# Patient Record
Sex: MALE | Race: WHITE | NOT HISPANIC OR LATINO | Employment: OTHER | URBAN - METROPOLITAN AREA
[De-identification: names, ages, dates, MRNs, and addresses within clinical notes are randomized per-mention and may not be internally consistent; named-entity substitution may affect disease eponyms.]

---

## 2017-01-10 ENCOUNTER — ALLSCRIPTS OFFICE VISIT (OUTPATIENT)
Dept: OTHER | Facility: OTHER | Age: 82
End: 2017-01-10

## 2017-04-11 ENCOUNTER — ALLSCRIPTS OFFICE VISIT (OUTPATIENT)
Dept: OTHER | Facility: OTHER | Age: 82
End: 2017-04-11

## 2018-01-13 VITALS — BODY MASS INDEX: 19.6 KG/M2 | WEIGHT: 140 LBS | HEIGHT: 71 IN | RESPIRATION RATE: 16 BRPM

## 2018-01-13 VITALS — WEIGHT: 140 LBS | HEIGHT: 71 IN | BODY MASS INDEX: 19.6 KG/M2

## 2018-01-18 NOTE — PROGRESS NOTES
Assessment    1  Foot pain (729 5) (M79 673)   2  Atherosclerotic peripheral vascular disease (440 20) (I70 209)   3  Onychomycosis (110 1) (B35 1)    Plan    · Follow-up visit in 3 months Evaluation and Treatment  Follow-up  Status: Hold For -  Scheduling  Requested for: [id number]   · Inspect your feet and legs daily if you have vascular disease ; Status:Complete;   Done:  [id number]    Discussion/Summary    Daily foot checks monitor for signs of infection discussed proper shoes  The treatment plan was reviewed with the patient/guardian  The patient/guardian understands and agrees with the treatment plan      Chief Complaint  Patient has history of CVA and mild left-sided weakness  He is walking with a walker  Patient has thick discolored dystrophic nails as history of nail fungus, has not tried any treatments  Patient has dry cracked skin plantar feet bilateral  Thick painful nails that hurt when walking and wearing shoes  Active Problems    1  Adenopathy (785 6) (R59 1)   2  Atherosclerotic peripheral vascular disease (440 20) (I70 209)   3  Callus (700) (L84)   4  Chronic obstructive pulmonary disease (496) (J44 9)   5  Foot pain (729 5) (M79 673)   6  Monoclonal gammopathy of undetermined significance (273 1) (D47 2)   7  Onychomycosis (110 1) (B35 1)   8  Xerosis cutis (706 8) (L85 3)    Surgical History    · History of Cataract Surgery   · History of Cath Stent Placement    The surgical history was reviewed and updated today  Family History    · Family history of cerebrovascular accident (V17 1) (Z82 3)    · Family history of Kidney disease, chronic, end stage on dialysis    Social History    · Denied: History of Drug Use   · Former smoker (V15 82) (B18 426)   · Marital History - Currently    · Retired From Work    Current Meds   1  Aspirin 81 MG Oral Tablet; Therapy: (Recorded:09Jun2015) to Recorded   2  Aspirin TABS; Therapy: (Recorded:[id number]) to Recorded   3   B-12 1000 MCG Oral Capsule; Therapy: (MLJZTEXF:[id number]) to Recorded   4  Tamsulosin HCl 0 4 MG CP24;   Therapy: (Recorded:09Jun2015) to Recorded   5  Zoloft TABS; Therapy: (Wally Stevens) to Recorded    The medication list was reviewed and updated today  Allergies    1  No Known Drug Allergies    2  No Known Environmental Allergies   3  No Known Food Allergies    Physical Exam    Constitutional: no acute distress, well appearing and well nourished  Cardiovascular: abnormal dorsalis pedis pulse, abnormal posterior tibialis pulse, dependence rubor and abnormal capillary refill  Orthopedic/Biomechanical: abnormal foot type, hammertoe(s), abnormal MPJ ROM, discolored nails and subungual debris  Skin: keratoses  Psychiatric: oriented to person, place, and time  Left Foot: Appearance: Normal except as noted: pes planus  Second toe deformities include hammer toe  Third toe deformities include hammer toe  Forth toe deformities include hammer toe  Fifth toe deformities include hammer toe  Significant xerosis plantar bilateral fissures bilateral heels  Positive effusion maceration fourth interspace bilateral middle tinea pedis  Special Tests: negative exudate  Negative erythema negative signs of infection  Right Foot: Appearance: Normal except as noted: pes planus  Second toe deformities include hammer toe  Third toe deformities include hammer toe  Forth toe deformities include hammer toe  Fifth toe deformities include hammer toe  Neurological Exam: performed  Light touch was intact bilaterally  Response to monofilament test was intact bilaterally  Vascular Exam: performed Dorsalis pedis pulses were 1/4 bilaterally  Posterior tibial pulses were absent bilaterally  Capillary refill time was greater than 3 seconds bilaterally  Edema: mild bilaterally  Toenails: All of the toenails were elongated, hypertrophied, discolored, shown to have subungual debris and tender  Both first toenails were ingrown  Hyperkeratosis: present on both first sub metatarsals  Procedure  Aseptic debridement and planing of nails x10, manually, and mechanically       Future Appointments    Date/Time Provider Specialty Site   04/20/2016 02:15 PM Lawyer Naima DPM Podiatry [address] [facility]     Signatures   Electronically signed by :  Elza Crowley DPM; Feb 2 2016 11:06AM EST                       (Author)